# Patient Record
(demographics unavailable — no encounter records)

---

## 2025-04-10 NOTE — HISTORY OF PRESENT ILLNESS
[FreeTextEntry1] : 42F w hx of cerebral anerysm 2019 presents to establish care Sent in by: PMD:   pt speaks Spanish,  here to translate.  pt overall feeling well. denies CP, SOB, at rest or on exertion. Denies palpitations, dizziness, diaphoresis, syncope, LE edema, orthopnea denies HA or blurry vision  pt on bp meds (got in [place]). pt checks bp at home, usually gets 120s systolic.  pt asking to have medications here, will send to pharmacy (lisinopril and norvasc)   Exercise: none Diet: none  Prev cardiac history: none Previous cardiac testing: none Recent labs:  EKG: SR poor r wave progression.   Med hx: hx of cerebral aneurysm, HTN	 OBGYN hx: no children.  no Hx of miscarriage. Currently with regular menstrual cycles Sx hx: cerebral stent	 Family hx: no known cardiac hx Social hx:  from [facility]. now lives in [place] with , . +tob. no etoh/drugs Meds: Perindopril 10 and amlodipine 10 (got it in [place]) Allergies: nkda

## 2025-04-10 NOTE — DISCUSSION/SUMMARY
[FreeTextEntry1] : 42F w hx of cerebral anerysm 2019 presents to establish care  Hypertension -Stage I   -pt without CP SOB HA blurry vision -discussed the importance of lifestyle modifications (diet/exercise) to improve BMI and reduce BP -will change meds to lisinopril 10 and norvasc 10 (available in [place])  -cont bp log at home, will review at next appt -ekg with poor r wave progression will check tte -f/u in 6 months unless requires sooner  45 min spent on complete encounter   [EKG obtained to assist in diagnosis and management of assessed problem(s)] : EKG obtained to assist in diagnosis and management of assessed problem(s)

## 2025-04-14 NOTE — DISCUSSION/SUMMARY
[Intensive Blood Pressure Control] : intensive blood pressure control [Lipid Lowering Therapy] : lipid lowering therapy [Patient encouraged to discuss with Primary MD] : I encouraged the patient to discuss these important issues with ~his/her~ primary care doctor [Goals and Counseling] : I have reviewed the goals of stroke risk factor modification. I counseled the patient on measures to reduce stroke risk, including the importance of medication compliance, risk factor control, exercise, healthy diet and avoidance of smoking. I reviewed stroke warning signs and symptoms and appropriate actions to take if such occur. [FreeTextEntry1] : Impression: She notes that in 2017 while living in [facility] she had a syncopal episode and neuro imaging noted 3 cerebral aneurysms. She denies any of them rupturing but notes she had them repaired. The first one was repaired 1 week after finding it with a craniotomy and possible clips and the other 2 were in 2018 via endovascular repair. She denies any neurological deficits. [place] moved to [place] a few months ago and is establishing care prior to trying to conceive.   I have ordered MRI head, MRA head to evaluate for presence of aneurysms. We discussed smoking cessation and healthy eating and exercise habits. She will speak to her OB about preconception counseling. Discussed importance of risk factor control. Signs of stroke discussed in detail. We will call her after imaging to discuss results.   All questions and concerns have been addressed

## 2025-04-14 NOTE — HISTORY OF PRESENT ILLNESS
[FreeTextEntry1] : Ms. Becerra is a 42-year-old right-handed female PMHx HTN, cerebral aneurysms who presents today to establish care after cerebral aneurysm repair. Patient is primarily [place]i speaking, so  used ID # [id number]  She notes that in 2017 while living in [place] she had a syncopal episode and neuro imaging noted 3 cerebral aneurysms. She denies any of them rupturing but notes she had them repaired. The first one was repaired 1 week after finding it with a craniotomy and possible clips and the other 2 were in 2018 via endovascular repair. She denies any neurological deficits. She moved to  a few months ago and is establishing care prior to trying to conceive.   She notes since moving to [place] she has gain a significant amount of weight, smokes daily and has been told her cholesterol is slightly elevated. She does not have any records from [place].

## 2025-04-14 NOTE — REASON FOR VISIT
[Consultation] : a consultation visit [Spouse] : spouse [FreeTextEntry1] : cerebral aneurysms  [Interpreters_IDNumber] : [id number] Vietnamese  [TWNoteComboBox1] : [place]